# Patient Record
(demographics unavailable — no encounter records)

---

## 2025-04-24 NOTE — ASSESSMENT
[FreeTextEntry1] : 90 year old F with HTN who presents for cardiac evaluation.  1) Palpitation, - EKG today showed sinus rhythm without significant abnormalities - Check 1 week Holter  2) HTN - Continue Edarbi 40 and amlodipine 5 daily for now  3) Follow-up, 1 month, pending echo

## 2025-04-24 NOTE — CARDIOLOGY SUMMARY
[de-identified] : RSR with tall R consistent with LVH, MATHIEU, poor R wave progression V1-3   , no interval changes.   [de-identified] :  stress echo on 5-3-2021 EF 70%, 4 mets with negative result for ischemia. [de-identified] : 9- stress echo, no major valve issue. LVEF 70%.  , no ischemia, reviewed. Repeating stress echo today on 12-3-2019, result with EF 70%, mild  pulmonary  hypertension, , negative for  ischemia, mildly dilated aortic root.

## 2025-04-24 NOTE — HISTORY OF PRESENT ILLNESS
[FreeTextEntry1] : Pt was referred by PCP for checkup. Pt has been stable. Pt reports rare palpitations, occurring randomly, lasting for a few seconds. It occurs approximately few times a month. It sometimes feels "slow." Pt denies CP, SOB, dizziness, LOC, orthopnea, PND, or LE edema. She walks daily for 1 hour without limitations. Her home SBP ranged 110-120. Today's /82 P 61.   Last seen by Dr. Plasencia 11/9/23 - 88-year-old female, has been stable without chest pain shortness of breath, dizziness or palpitations. She had, long ago, episode of fall, syncope in [place]. There is no recurrence. She is going to have long trip to [place]. At ordinary condition she is asymptomatic. During pandemic, she is sedentary, and home bound, but without manifestation or event. She has completed the covid vaccinations. She is here for evaluation.

## 2025-04-24 NOTE — REASON FOR VISIT
[Symptom and Test Evaluation] : symptom and test evaluation [Arrhythmia/ECG Abnorrmalities] : arrhythmia/ECG abnormalities [FreeTextEntry1] : 90 year old F with HTN who presents for cardiac evaluation.  DAYANARA 5/3/21 - 3 min Jerrod, normal, EF 70%, mild MR, mild-mod AI, moderate TR, mild dilatation of ascending aorta (3.9 cm)  Pt is on Edarbi 40mg, Amlodipine 5mg.

## 2025-04-24 NOTE — CARDIOLOGY SUMMARY
[de-identified] : RSR with tall R consistent with LVH, MATHIEU, poor R wave progression V1-3   , no interval changes.   [de-identified] :  stress echo on 5-3-2021 EF 70%, 4 mets with negative result for ischemia. [de-identified] : 9- stress echo, no major valve issue. LVEF 70%.  , no ischemia, reviewed. Repeating stress echo today on 12-3-2019, result with EF 70%, mild  pulmonary  hypertension, , negative for  ischemia, mildly dilated aortic root.

## 2025-05-04 NOTE — HISTORY OF PRESENT ILLNESS
[FreeTextEntry1] : Pt has been stable since last visit. She is here to review echo result. She is mailing out her Ziopatch today.  4/24/25 - Pt was referred by PCP for checkup. Pt has been stable. Pt reports rare palpitations, occurring randomly, lasting for a few seconds. It occurs approximately few times a month. It sometimes feels "slow." Pt denies CP, SOB, dizziness, LOC, orthopnea, PND, or LE edema. She walks daily for 1 hour without limitations. Her home SBP ranged 110-120. Today's /82 P 61.  Last seen by Dr. Plasencia 11/9/23 - 88-year-old female, has been stable without chest pain shortness of breath, dizziness or palpitations. She had, long ago, episode of fall, syncope in [place]. There is no recurrence. She is going to have long trip to [facility]. At ordinary condition she is asymptomatic. During pandemic, she is sedentary, and home bound, but without manifestation or event. She has completed the covid vaccinations. She is here for evaluation.

## 2025-05-04 NOTE — HISTORY OF PRESENT ILLNESS
[FreeTextEntry1] : Pt has been stable since last visit. She is here to review echo result. She is mailing out her Ziopatch today.  4/24/25 - Pt was referred by PCP for checkup. Pt has been stable. Pt reports rare palpitations, occurring randomly, lasting for a few seconds. It occurs approximately few times a month. It sometimes feels "slow." Pt denies CP, SOB, dizziness, LOC, orthopnea, PND, or LE edema. She walks daily for 1 hour without limitations. Her home SBP ranged 110-120. Today's /82 P 61.  Last seen by Dr. Plasencia 11/9/23 - 88-year-old female, has been stable without chest pain shortness of breath, dizziness or palpitations. She had, long ago, episode of fall, syncope in [place]. There is no recurrence. She is going to have long trip to [facility]. At ordinary condition she is asymptomatic. During pandemic, she is sedentary, and home bound, but without manifestation or event. She has completed the covid vaccinations. She is here for evaluation.

## 2025-05-04 NOTE — REASON FOR VISIT
[Symptom and Test Evaluation] : symptom and test evaluation [Arrhythmia/ECG Abnorrmalities] : arrhythmia/ECG abnormalities [FreeTextEntry1] : 90 year old F with HTN who presents for f/u.   DAYANARA 5/3/21 - 3 min Jerrod, normal, EF 70%, mild MR, mild-mod AI, moderate TR, mild dilatation of ascending aorta (3.9 cm)  Pt is on Edarbi 40mg, Amlodipine 5mg.

## 2025-05-04 NOTE — ASSESSMENT
[FreeTextEntry1] : 90 year old F with HTN who presents for f/u.   1) Palpitation, - F/U 1 week Holter result - Pt underwent TTE 5/2/25 which showed normal LVEF 65-70%, normal RV size/function, mild-moderate AI, moderate TR and mild pHTN (PASP 43 mmHg, was already borderline-mild in 2021 TTE) - She is euvolemic on exam without SOB or LE edema. Patient denies any prior pulmonary history. I advised pt to continue monitor symptoms for now and keep BP under good control.  2) HTN - Continue Edarbi 40 and amlodipine 5 daily   3) Follow-up, pending Holter, otherwise annually or sooner if symptomatic

## 2025-05-04 NOTE — HISTORY OF PRESENT ILLNESS
[FreeTextEntry1] : Pt has been stable since last visit. She is here to review echo result. She is mailing out her Ziopatch today.  4/24/25 - Pt was referred by PCP for checkup. Pt has been stable. Pt reports rare palpitations, occurring randomly, lasting for a few seconds. It occurs approximately few times a month. It sometimes feels "slow." Pt denies CP, SOB, dizziness, LOC, orthopnea, PND, or LE edema. She walks daily for 1 hour without limitations. Her home SBP ranged 110-120. Today's /82 P 61.  Last seen by Dr. Plasencia 11/9/23 - 88-year-old female, has been stable without chest pain shortness of breath, dizziness or palpitations. She had, long ago, episode of fall, syncope in [place]. There is no recurrence. She is going to have long trip to [facility]. At ordinary condition she is asymptomatic. During pandemic, she is sedentary, and home bound, but without manifestation or event. She has completed the covid vaccinations. She is here for evaluation.

## 2025-05-04 NOTE — REASON FOR VISIT
[Symptom and Test Evaluation] : symptom and test evaluation [Arrhythmia/ECG Abnorrmalities] : arrhythmia/ECG abnormalities [FreeTextEntry1] : 90 year old F with HTN who presents for f/u.   DAYANARA 5/3/21 - 3 min Jrerod, normal, EF 70%, mild MR, mild-mod AI, moderate TR, mild dilatation of ascending aorta (3.9 cm)  Pt is on Edarbi 40mg, Amlodipine 5mg.